# Patient Record
Sex: FEMALE | Race: WHITE | NOT HISPANIC OR LATINO | Employment: OTHER | URBAN - METROPOLITAN AREA
[De-identification: names, ages, dates, MRNs, and addresses within clinical notes are randomized per-mention and may not be internally consistent; named-entity substitution may affect disease eponyms.]

---

## 2023-11-27 ENCOUNTER — APPOINTMENT (OUTPATIENT)
Dept: CT IMAGING | Facility: HOSPITAL | Age: 68
End: 2023-11-27
Attending: EMERGENCY MEDICINE
Payer: COMMERCIAL

## 2023-11-27 ENCOUNTER — HOSPITAL ENCOUNTER (EMERGENCY)
Facility: HOSPITAL | Age: 68
Discharge: HOME | End: 2023-11-27
Attending: EMERGENCY MEDICINE | Admitting: EMERGENCY MEDICINE
Payer: COMMERCIAL

## 2023-11-27 VITALS
WEIGHT: 148 LBS | DIASTOLIC BLOOD PRESSURE: 64 MMHG | HEIGHT: 62 IN | HEART RATE: 74 BPM | SYSTOLIC BLOOD PRESSURE: 119 MMHG | RESPIRATION RATE: 12 BRPM | BODY MASS INDEX: 27.23 KG/M2 | TEMPERATURE: 98.5 F | OXYGEN SATURATION: 99 %

## 2023-11-27 DIAGNOSIS — R07.81 PLEURITIC CHEST PAIN: ICD-10-CM

## 2023-11-27 LAB
ALBUMIN SERPL BCG-MCNC: 4.6 G/DL (ref 3.5–5.2)
ALP SERPL-CCNC: 72 U/L (ref 40–150)
ALT SERPL W P-5'-P-CCNC: 24 U/L (ref 0–50)
ANION GAP SERPL CALCULATED.3IONS-SCNC: 8 MMOL/L (ref 7–15)
APTT PPP: 28 SECONDS (ref 22–38)
AST SERPL W P-5'-P-CCNC: 37 U/L (ref 0–45)
BASOPHILS # BLD AUTO: 0 10E3/UL (ref 0–0.2)
BASOPHILS NFR BLD AUTO: 0 %
BILIRUB DIRECT SERPL-MCNC: <0.2 MG/DL (ref 0–0.3)
BILIRUB SERPL-MCNC: 0.5 MG/DL
BUN SERPL-MCNC: 15.7 MG/DL (ref 8–23)
CALCIUM SERPL-MCNC: 10.3 MG/DL (ref 8.8–10.2)
CHLORIDE SERPL-SCNC: 99 MMOL/L (ref 98–107)
CREAT SERPL-MCNC: 0.79 MG/DL (ref 0.51–0.95)
D DIMER PPP FEU-MCNC: 0.54 UG/ML FEU (ref 0–0.5)
DEPRECATED HCO3 PLAS-SCNC: 31 MMOL/L (ref 22–29)
EGFRCR SERPLBLD CKD-EPI 2021: 82 ML/MIN/1.73M2
EOSINOPHIL # BLD AUTO: 0 10E3/UL (ref 0–0.7)
EOSINOPHIL NFR BLD AUTO: 0 %
ERYTHROCYTE [DISTWIDTH] IN BLOOD BY AUTOMATED COUNT: 12.1 % (ref 10–15)
GLUCOSE SERPL-MCNC: 96 MG/DL (ref 70–99)
HCT VFR BLD AUTO: 41.7 % (ref 35–47)
HGB BLD-MCNC: 14.5 G/DL (ref 11.7–15.7)
IMM GRANULOCYTES # BLD: 0 10E3/UL
IMM GRANULOCYTES NFR BLD: 0 %
INR PPP: 0.98 (ref 0.85–1.15)
LIPASE SERPL-CCNC: 56 U/L (ref 13–60)
LYMPHOCYTES # BLD AUTO: 1.5 10E3/UL (ref 0.8–5.3)
LYMPHOCYTES NFR BLD AUTO: 26 %
MCH RBC QN AUTO: 32.4 PG (ref 26.5–33)
MCHC RBC AUTO-ENTMCNC: 34.8 G/DL (ref 31.5–36.5)
MCV RBC AUTO: 93 FL (ref 78–100)
MONOCYTES # BLD AUTO: 0.4 10E3/UL (ref 0–1.3)
MONOCYTES NFR BLD AUTO: 7 %
NEUTROPHILS # BLD AUTO: 3.7 10E3/UL (ref 1.6–8.3)
NEUTROPHILS NFR BLD AUTO: 67 %
NRBC # BLD AUTO: 0 10E3/UL
NRBC BLD AUTO-RTO: 0 /100
NT-PROBNP SERPL-MCNC: 77 PG/ML (ref 0–900)
PLATELET # BLD AUTO: 252 10E3/UL (ref 150–450)
POTASSIUM SERPL-SCNC: 4.1 MMOL/L (ref 3.4–5.3)
PROT SERPL-MCNC: 7.3 G/DL (ref 6.4–8.3)
RBC # BLD AUTO: 4.48 10E6/UL (ref 3.8–5.2)
SODIUM SERPL-SCNC: 138 MMOL/L (ref 135–145)
TROPONIN T SERPL HS-MCNC: 8 NG/L
WBC # BLD AUTO: 5.6 10E3/UL (ref 4–11)

## 2023-11-27 PROCEDURE — 93005 ELECTROCARDIOGRAM TRACING: CPT | Performed by: STUDENT IN AN ORGANIZED HEALTH CARE EDUCATION/TRAINING PROGRAM

## 2023-11-27 PROCEDURE — 36415 COLL VENOUS BLD VENIPUNCTURE: CPT | Performed by: EMERGENCY MEDICINE

## 2023-11-27 PROCEDURE — 99285 EMERGENCY DEPT VISIT HI MDM: CPT | Mod: 25

## 2023-11-27 PROCEDURE — 250N000011 HC RX IP 250 OP 636: Mod: JZ | Performed by: EMERGENCY MEDICINE

## 2023-11-27 PROCEDURE — 71275 CT ANGIOGRAPHY CHEST: CPT

## 2023-11-27 PROCEDURE — 82248 BILIRUBIN DIRECT: CPT | Performed by: EMERGENCY MEDICINE

## 2023-11-27 PROCEDURE — 80053 COMPREHEN METABOLIC PANEL: CPT | Performed by: EMERGENCY MEDICINE

## 2023-11-27 PROCEDURE — 84484 ASSAY OF TROPONIN QUANT: CPT | Performed by: EMERGENCY MEDICINE

## 2023-11-27 PROCEDURE — 85610 PROTHROMBIN TIME: CPT | Performed by: EMERGENCY MEDICINE

## 2023-11-27 PROCEDURE — 85379 FIBRIN DEGRADATION QUANT: CPT | Performed by: EMERGENCY MEDICINE

## 2023-11-27 PROCEDURE — 85025 COMPLETE CBC W/AUTO DIFF WBC: CPT | Performed by: EMERGENCY MEDICINE

## 2023-11-27 PROCEDURE — 93005 ELECTROCARDIOGRAM TRACING: CPT | Performed by: EMERGENCY MEDICINE

## 2023-11-27 PROCEDURE — 70450 CT HEAD/BRAIN W/O DYE: CPT

## 2023-11-27 PROCEDURE — 83880 ASSAY OF NATRIURETIC PEPTIDE: CPT | Performed by: EMERGENCY MEDICINE

## 2023-11-27 PROCEDURE — 85730 THROMBOPLASTIN TIME PARTIAL: CPT | Performed by: EMERGENCY MEDICINE

## 2023-11-27 PROCEDURE — 83690 ASSAY OF LIPASE: CPT | Performed by: EMERGENCY MEDICINE

## 2023-11-27 RX ORDER — IOPAMIDOL 755 MG/ML
75 INJECTION, SOLUTION INTRAVASCULAR ONCE
Status: COMPLETED | OUTPATIENT
Start: 2023-11-27 | End: 2023-11-27

## 2023-11-27 RX ADMIN — IOPAMIDOL 75 ML: 755 INJECTION, SOLUTION INTRAVENOUS at 18:41

## 2023-11-27 ASSESSMENT — ACTIVITIES OF DAILY LIVING (ADL)
ADLS_ACUITY_SCORE: 35
ADLS_ACUITY_SCORE: 35

## 2023-11-27 NOTE — ED TRIAGE NOTES
"Patient reports on Thursday would get sharp chest pains with inspiration. Today pain continues and started to have left sided neck discomfort at well that would radiate to left arm. Reports \"heartburn\" this morning, has not had this \"for a very long time\". Family history of aortic aneurysm.        "

## 2023-11-28 NOTE — ED PROVIDER NOTES
"  Emergency Department Encounter     Evaluation Date & Time:   2023  4:51 PM    CHIEF COMPLAINT:  Chest Pain      Triage Note:Patient reports on Thursday would get sharp chest pains with inspiration. Today pain continues and started to have left sided neck discomfort at well that would radiate to left arm. Reports \"heartburn\" this morning, has not had this \"for a very long time\". Family history of aortic aneurysm.              Impression and Plan       FINAL IMPRESSION:    ICD-10-CM    1. Pleuritic chest pain  R07.81             ED COURSE & MEDICAL DECISION MAKIN year old female, history of HLD, vertigo and GERD, who presents for evaluation of sharp, pleuritic substernal chest pain that has been intermittent x 5 days. No associated shortness of breath or lightheadedness. She did have some acid reflux, but denies associated nausea / vomiting. She also reports a left-sided headache with some coolness / tingling sensation in her left fingers today. No extremity weakness, vision changes.     On exam, she has RRR with clear and symmetrical breath sounds.      Patient placed on cardiac monitor, IV access established and blood sent for labs.    EKG performed and demonstrated NSR with septal Q waves and no acute ischemic changes.  Troponin WNL (8); a single, normal troponin is reassuring that symptoms are not secondary to ACS given that they have been ongoing for >6 hours and I do not think serial troponin testing is indicated.    PE considered for which a d-dimer was checked and slightly above normal limits (0.54). CTA chest performed and was negative for PE or other acute abnormality.     No clinical, radiographic or laboratory (BNP 77) evidence of acute CHF.    Neuro exam is normal, however given headache, head CT performed and was unremarkable.    Labs otherwise remarkable for no leukocytosis, anemia, significant electrolyte derangements or renal impairment.  No laboratory evidence of hepatitis, biliary " obstruction or pancreatitis.    Patient discharged to home with follow-up with her PCP Tuesday, as previously arranged.  Return precautions provided.  Patient stable throughout ED course.      At the conclusion of the encounter I discussed the results of all the tests and the disposition. The questions were answered. The patient and family acknowledged understanding and were agreeable with the care plan.      Medical Decision Making    History:  Supplemental history from: Documented in chart, if applicable  External Record(s) reviewed: Documented in chart, if applicable.    Work Up:  Chart documentation includes differential considered and any EKGs or imaging independently interpreted by provider, where specified.  In additional to work up documented, I considered the following work up: Documented in chart, if applicable.    External consultation:  Discussion of management with another provider: Documented in chart, if applicable    Complicating factors:  Care impacted by chronic illness: Hyperlipidemia and Other: GERD  Care affected by social determinants of health: N/A    Disposition considerations: Discharge. No recommendations on prescription strength medication(s). I considered admission, but ultimately discharged patient given reassuring evaluation.        MEDICATIONS GIVEN IN THE EMERGENCY DEPARTMENT:  Medications   iopamidol (ISOVUE-370) solution 75 mL (75 mLs Intravenous $Given 11/27/23 1433)       NEW PRESCRIPTIONS STARTED AT TODAY'S ED VISIT:  New Prescriptions    No medications on file       HPI     HPI     Margarette Gaona is a 68 year old female, history of HLD, vertigo and GERD, who presents to this ED for evaluation of chest pain.    Patient had onset of chest pain Thursday (5 days ago), which has been intermittent since onset. The pain resolved after ~24 hours Thursday into Friday, but then recurred and has been less constant since. The pain is substernal in location with some radiation toward her  "left neck. The pain is sharp in nature and occurs only with inspiration. She denies associated shortness of breath or lightheadedness. She did have some acid reflux, which she has not experienced for some time, but denies associated nausea / vomiting.    She also reports a left-sided headache with some coolness / tingling sensation in her left fingers today. No extremity weakness, vision changes.     She has otherwise been in her usual state of health and denies abdominal pain, N/V/D, cough, fever or other concerns.      She does not take any exogenous estrogens and has not recently traveled.  No family history of clotting disorder.    REVIEW OF SYSTEMS:  All other systems reviewed and are negative.      Medical History     No past medical history on file.    No past surgical history on file.    No family history on file.         No current outpatient medications on file.      Physical Exam     First Vitals:  Patient Vitals for the past 24 hrs:   BP Temp Temp src Pulse Resp SpO2 Height Weight   11/27/23 2035 119/64 -- -- 74 -- 99 % -- --   11/27/23 2012 120/63 -- -- 70 12 97 % -- --   11/27/23 2000 126/69 -- -- 70 16 98 % -- --   11/27/23 1948 (!) 140/74 -- -- 70 30 100 % -- --   11/27/23 1819 127/71 -- -- 94 25 99 % -- --   11/27/23 1800 123/67 -- -- 68 15 97 % -- --   11/27/23 1743 134/71 -- -- 69 12 98 % -- --   11/27/23 1737 (!) 169/83 -- -- 76 24 95 % -- --   11/27/23 1719 (!) 152/70 -- -- 73 -- 98 % -- --   11/27/23 1659 (!) 138/120 -- -- 75 19 100 % -- --   11/27/23 1652 139/86 -- -- -- -- -- -- --   11/27/23 1631 131/66 98.5  F (36.9  C) Oral 79 20 95 % 1.575 m (5' 2\") 67.1 kg (148 lb)       PHYSICAL EXAM:   Physical Exam    GENERAL: Awake, alert.  In no acute distress.   HEENT: Normocephalic, atraumatic. Pupils equal, round and reactive. Conjunctiva normal. EOMI without nystagmus.  Normal posterior oropharynx.  Tongue is midline.  NECK: No stridor.  PULMONARY: Symmetrical breath sounds without distress.  " Lungs clear to auscultation bilaterally without wheezes, rhonchi or rales.  CARDIO: Regular rate and rhythm.  No significant murmur, rub or gallop.  Radial pulses strong and symmetrical.  ABDOMINAL: Abdomen soft, non-distended and non-tender to palpation.  EXTREMITIES: No lower extremity swelling or edema.      NEURO: Alert and oriented to person, place and time.  Cranial nerves III-XII intact.  Strength 5/5 BL upper and lower extremities with sensation to light touch grossly intact.  PSYCH: Normal mood and affect.      Results     LAB:  All pertinent labs reviewed and interpreted  Labs Ordered and Resulted from Time of ED Arrival to Time of ED Departure   BASIC METABOLIC PANEL - Abnormal       Result Value    Sodium 138      Potassium 4.1      Chloride 99      Carbon Dioxide (CO2) 31 (*)     Anion Gap 8      Urea Nitrogen 15.7      Creatinine 0.79      GFR Estimate 82      Calcium 10.3 (*)     Glucose 96     D DIMER QUANTITATIVE - Abnormal    D-Dimer Quantitative 0.54 (*)    TROPONIN T, HIGH SENSITIVITY - Normal    Troponin T, High Sensitivity 8     NT PROBNP INPATIENT - Normal    N terminal Pro BNP Inpatient 77     INR - Normal    INR 0.98     PARTIAL THROMBOPLASTIN TIME - Normal    aPTT 28     HEPATIC FUNCTION PANEL - Normal    Protein Total 7.3      Albumin 4.6      Bilirubin Total 0.5      Alkaline Phosphatase 72      AST 37      ALT 24      Bilirubin Direct <0.20     LIPASE - Normal    Lipase 56     CBC WITH PLATELETS AND DIFFERENTIAL    WBC Count 5.6      RBC Count 4.48      Hemoglobin 14.5      Hematocrit 41.7      MCV 93      MCH 32.4      MCHC 34.8      RDW 12.1      Platelet Count 252      % Neutrophils 67      % Lymphocytes 26      % Monocytes 7      % Eosinophils 0      % Basophils 0      % Immature Granulocytes 0      NRBCs per 100 WBC 0      Absolute Neutrophils 3.7      Absolute Lymphocytes 1.5      Absolute Monocytes 0.4      Absolute Eosinophils 0.0      Absolute Basophils 0.0      Absolute  Immature Granulocytes 0.0      Absolute NRBCs 0.0         RADIOLOGY:  CT Chest Pulmonary Embolism w Contrast   Final Result   IMPRESSION:   1.  No evidence of pulmonary embolus or other acute abnormality in the chest.      CT Head w/o Contrast   Final Result   IMPRESSION:     1.  No evidence of acute intracranial hemorrhage or mass effect.          EC2023, 16:25; NSR with rate of 72 bpm; normal intervals; normal conduction; septal Q waves; no ST-T wave elevation consistent with ACS or pericarditis; no previous EKG available for comparison    EKG independently reviewed and interpreted by MD Zahra Payne MD  Emergency Medicine  [facility] EMERGENCY DEPARTMENT           Zahra Call MD  [phone]

## 2023-11-28 NOTE — DISCHARGE INSTRUCTIONS
Please follow-up with your Primary Care Provider tomorrow, as previously scheduled.     Return to the ER for worsening symptoms, worsening chest pain, shortness of breath, if you pass out or feel that you might, nausea / vomiting, fever or other concerns.

## 2023-12-06 LAB
ATRIAL RATE - MUSE: 72 BPM
DIASTOLIC BLOOD PRESSURE - MUSE: NORMAL MMHG
INTERPRETATION ECG - MUSE: NORMAL
P AXIS - MUSE: 59 DEGREES
PR INTERVAL - MUSE: 150 MS
QRS DURATION - MUSE: 86 MS
QT - MUSE: 380 MS
QTC - MUSE: 416 MS
R AXIS - MUSE: 59 DEGREES
SYSTOLIC BLOOD PRESSURE - MUSE: NORMAL MMHG
T AXIS - MUSE: 38 DEGREES
VENTRICULAR RATE- MUSE: 72 BPM